# Patient Record
Sex: MALE | Race: WHITE | NOT HISPANIC OR LATINO | Employment: FULL TIME | URBAN - METROPOLITAN AREA
[De-identification: names, ages, dates, MRNs, and addresses within clinical notes are randomized per-mention and may not be internally consistent; named-entity substitution may affect disease eponyms.]

---

## 2022-08-02 ENCOUNTER — OCCUPATIONAL HEALTH (OUTPATIENT)
Dept: URGENT CARE | Facility: CLINIC | Age: 37
End: 2022-08-02

## 2022-08-02 DIAGNOSIS — Z13.9 ENCOUNTER FOR SCREENING: Primary | ICD-10-CM

## 2022-08-02 DIAGNOSIS — Z02.1 PRE-EMPLOYMENT EXAMINATION: Primary | ICD-10-CM

## 2022-08-02 LAB
BREATH ALCOHOL: 0
COLLECTION ONLY: NORMAL
DLCO: NORMAL
FEV1/FVC: NORMAL
FEV1: NORMAL
FVC: NORMAL
TLC: NORMAL

## 2022-08-02 PROCEDURE — 99499 PHYSICAL, BASIC COMPLEXITY: ICD-10-PCS | Mod: S$GLB,,, | Performed by: NURSE PRACTITIONER

## 2022-08-02 PROCEDURE — 92552 PURE TONE AUDIOMETRY AIR: CPT | Mod: S$GLB,,, | Performed by: NURSE PRACTITIONER

## 2022-08-02 PROCEDURE — 97750 PHYSICAL PERFORMANCE TEST: CPT | Mod: S$GLB,,, | Performed by: NURSE PRACTITIONER

## 2022-08-02 PROCEDURE — 80305 PR HAIR COLLECTION ONLY: ICD-10-PCS | Mod: S$GLB,,, | Performed by: EMERGENCY MEDICINE

## 2022-08-02 PROCEDURE — 94010 PULMONARY FUNCTION SCREENING (OCC MED PHYSICALS): ICD-10-PCS | Mod: S$GLB,,, | Performed by: NURSE PRACTITIONER

## 2022-08-02 PROCEDURE — 97750 AGILITY TEST: ICD-10-PCS | Mod: S$GLB,,, | Performed by: NURSE PRACTITIONER

## 2022-08-02 PROCEDURE — 80305 DRUG TEST PRSMV DIR OPT OBS: CPT | Mod: S$GLB,,, | Performed by: EMERGENCY MEDICINE

## 2022-08-02 PROCEDURE — 99080 OSHA QUESTIONNAIRE: ICD-10-PCS | Mod: S$GLB,,, | Performed by: NURSE PRACTITIONER

## 2022-08-02 PROCEDURE — 99172 VISUAL SCREEN, AUTOMATED W/COLOR VISION: ICD-10-PCS | Mod: S$GLB,,, | Performed by: NURSE PRACTITIONER

## 2022-08-02 PROCEDURE — 99172 OCULAR FUNCTION SCREEN: CPT | Mod: S$GLB,,, | Performed by: NURSE PRACTITIONER

## 2022-08-02 PROCEDURE — 99080 SPECIAL REPORTS OR FORMS: CPT | Mod: S$GLB,,, | Performed by: NURSE PRACTITIONER

## 2022-08-02 PROCEDURE — 82075 ASSAY OF BREATH ETHANOL: CPT | Mod: S$GLB,,, | Performed by: EMERGENCY MEDICINE

## 2022-08-02 PROCEDURE — 82075 CHG ASSAY OF BREATH ETHANOL: ICD-10-PCS | Mod: S$GLB,,, | Performed by: EMERGENCY MEDICINE

## 2022-08-02 PROCEDURE — 94010 BREATHING CAPACITY TEST: CPT | Mod: S$GLB,,, | Performed by: NURSE PRACTITIONER

## 2022-08-02 PROCEDURE — 92552 AUDIOGRAM OCC MED: ICD-10-PCS | Mod: S$GLB,,, | Performed by: NURSE PRACTITIONER

## 2022-08-02 PROCEDURE — 99499 UNLISTED E&M SERVICE: CPT | Mod: S$GLB,,, | Performed by: NURSE PRACTITIONER

## 2022-12-30 ENCOUNTER — TELEPHONE (OUTPATIENT)
Dept: TRANSPLANT | Facility: CLINIC | Age: 37
End: 2022-12-30

## 2022-12-30 NOTE — TELEPHONE ENCOUNTER
Contacted patient to review initial intake information. Patient reports the followin. Can you walk up a flight of stairs without getting short of breath or stopping? Yes     2. Can you walk one block without getting short of breath or having to stop? Yes   3. Do you use oxygen? No   4. Do you use a cane, walker, or wheel chair to assist in mobility? No   5. Have you been hospitalized or had recent surgery in the last 6 months? No    A. Stroke   B. Heart surgery or heart catheterization   C. Broken bone  6. Do you have any cuts, open sores (ulcers), or wounds anywhere on your body? No    7. Do you go to dialysis? Yes What days? Daily  8. Preferred appointment day? Anyday  9. Caregiver? Wife (Patricia)    Patient is aware the next steps will include completing records and compliance verification. Patient is aware once provider review and insurance authorization is received we will contact patient to schedule initial visit. Patient is aware that initial visit will begin prior to / at 7 am and will conclude at approximately 3 pm on date of appointment. All questions answered at this time.

## 2023-01-03 ENCOUNTER — TELEPHONE (OUTPATIENT)
Dept: TRANSPLANT | Facility: CLINIC | Age: 38
End: 2023-01-03
Payer: COMMERCIAL

## 2023-01-31 ENCOUNTER — TELEPHONE (OUTPATIENT)
Dept: TRANSPLANT | Facility: CLINIC | Age: 38
End: 2023-01-31
Payer: COMMERCIAL

## 2023-11-16 ENCOUNTER — TELEPHONE (OUTPATIENT)
Dept: TRANSPLANT | Facility: CLINIC | Age: 38
End: 2023-11-16
Payer: MEDICARE

## 2023-11-27 ENCOUNTER — TELEPHONE (OUTPATIENT)
Dept: TRANSPLANT | Facility: CLINIC | Age: 38
End: 2023-11-27
Payer: MEDICARE

## 2023-12-04 ENCOUNTER — TELEPHONE (OUTPATIENT)
Dept: TRANSPLANT | Facility: CLINIC | Age: 38
End: 2023-12-04
Payer: MEDICARE

## 2023-12-06 DIAGNOSIS — Z76.82 ORGAN TRANSPLANT CANDIDATE: Primary | ICD-10-CM

## 2023-12-07 DIAGNOSIS — Z76.82 ORGAN TRANSPLANT CANDIDATE: Primary | ICD-10-CM

## 2024-01-22 ENCOUNTER — TELEPHONE (OUTPATIENT)
Dept: TRANSPLANT | Facility: CLINIC | Age: 39
End: 2024-01-22
Payer: MEDICARE

## 2024-01-22 NOTE — TELEPHONE ENCOUNTER
Returned patient phone call, no answer. Unable to leave voicemail message     ----- Message from Jeanna Aiyana sent at 1/22/2024  3:34 PM CST -----  Regarding: check on labs  Contact: PT  [phone]  The patient called requesting to speak to Nurse regarding labs he had done last Thursday, echo, EKG and also Nuc Stress test at [facility], wants to make sure test are being duplicated. PT states you can contact [facility] and they will send whatever you need. PT is scheduled for a bunch of labs on 1/25 - please verify is duplicates. Please reach out to patient to advise.     No further information provided  [facility] - Austin Martinez   - coordinator      Patient can be contacted @# [phone]

## 2024-01-25 ENCOUNTER — HOSPITAL ENCOUNTER (OUTPATIENT)
Dept: RADIOLOGY | Facility: HOSPITAL | Age: 39
Discharge: HOME | End: 2024-01-25
Payer: MEDICARE

## 2024-01-25 ENCOUNTER — TELEPHONE (OUTPATIENT)
Dept: TRANSPLANT | Facility: CLINIC | Age: 39
End: 2024-01-25
Payer: MEDICARE

## 2024-01-25 ENCOUNTER — OFFICE VISIT (OUTPATIENT)
Dept: TRANSPLANT | Facility: CLINIC | Age: 39
End: 2024-01-25
Payer: MEDICARE

## 2024-01-25 ENCOUNTER — HOSPITAL ENCOUNTER (OUTPATIENT)
Dept: RADIOLOGY | Facility: HOSPITAL | Age: 39
Discharge: HOME | End: 2024-01-25
Attending: NURSE PRACTITIONER
Payer: MEDICARE

## 2024-01-25 VITALS
TEMPERATURE: 97 F | BODY MASS INDEX: 29.59 KG/M2 | DIASTOLIC BLOOD PRESSURE: 74 MMHG | WEIGHT: 199.75 LBS | HEIGHT: 69 IN | OXYGEN SATURATION: 98 % | HEART RATE: 89 BPM | SYSTOLIC BLOOD PRESSURE: 119 MMHG

## 2024-01-25 DIAGNOSIS — Z76.82 ORGAN TRANSPLANT CANDIDATE: ICD-10-CM

## 2024-01-25 DIAGNOSIS — N25.81 SECONDARY HYPERPARATHYROIDISM: Chronic | ICD-10-CM

## 2024-01-25 DIAGNOSIS — Q61.3 POLYCYSTIC KIDNEY: Chronic | ICD-10-CM

## 2024-01-25 DIAGNOSIS — Z01.818 PRE-TRANSPLANT EVALUATION FOR KIDNEY TRANSPLANT: Primary | ICD-10-CM

## 2024-01-25 DIAGNOSIS — N18.6 ESRD ON PERITONEAL DIALYSIS: ICD-10-CM

## 2024-01-25 DIAGNOSIS — I12.9 RENAL HYPERTENSION: ICD-10-CM

## 2024-01-25 DIAGNOSIS — Z76.82 ORGAN TRANSPLANT CANDIDATE: Primary | ICD-10-CM

## 2024-01-25 DIAGNOSIS — Z99.2 ESRD ON PERITONEAL DIALYSIS: ICD-10-CM

## 2024-01-25 PROCEDURE — 76770 US EXAM ABDO BACK WALL COMP: CPT | Mod: TC,TXP

## 2024-01-25 PROCEDURE — 71046 X-RAY EXAM CHEST 2 VIEWS: CPT | Mod: 26,TXP,, | Performed by: STUDENT IN AN ORGANIZED HEALTH CARE EDUCATION/TRAINING PROGRAM

## 2024-01-25 PROCEDURE — 72170 X-RAY EXAM OF PELVIS: CPT | Mod: 26,TXP,, | Performed by: RADIOLOGY

## 2024-01-25 PROCEDURE — 76770 US EXAM ABDO BACK WALL COMP: CPT | Mod: 26,TXP,, | Performed by: RADIOLOGY

## 2024-01-25 PROCEDURE — 72170 X-RAY EXAM OF PELVIS: CPT | Mod: TC,TXP

## 2024-01-25 PROCEDURE — 99204 OFFICE O/P NEW MOD 45 MIN: CPT | Mod: S$PBB,TXP,, | Performed by: PHYSICIAN ASSISTANT

## 2024-01-25 PROCEDURE — 71046 X-RAY EXAM CHEST 2 VIEWS: CPT | Mod: TC,TXP

## 2024-01-25 PROCEDURE — 99205 OFFICE O/P NEW HI 60 MIN: CPT | Mod: S$PBB,TXP,, | Performed by: NURSE PRACTITIONER

## 2024-01-25 PROCEDURE — 99999 PR PBB SHADOW E&M-EST. PATIENT-LVL IV: CPT | Mod: PBBFAC,TXP,, | Performed by: NURSE PRACTITIONER

## 2024-01-25 PROCEDURE — 99214 OFFICE O/P EST MOD 30 MIN: CPT | Mod: PBBFAC,25,TXP | Performed by: NURSE PRACTITIONER

## 2024-01-25 RX ORDER — ALLOPURINOL 100 MG/1
100 TABLET ORAL DAILY
COMMUNITY

## 2024-01-25 RX ORDER — CALCIUM CARBONATE 200(500)MG
2 TABLET,CHEWABLE ORAL
COMMUNITY

## 2024-01-25 RX ORDER — CALCITRIOL 0.5 UG/1
0.5 CAPSULE ORAL
COMMUNITY

## 2024-01-25 RX ORDER — CARVEDILOL 12.5 MG/1
12.5 TABLET ORAL DAILY
COMMUNITY

## 2024-01-25 RX ORDER — POTASSIUM CHLORIDE 750 MG/1
10 TABLET, EXTENDED RELEASE ORAL ONCE
COMMUNITY

## 2024-01-25 NOTE — PROGRESS NOTES
Transplant Surgery  Kidney Transplant Recipient Evaluation    Referring Physician: Waldemar Landry  Current Nephrologist: Waldemar Landry    Subjective:     Reason for Visit: evaluate transplant candidacy    History of Present Illness: Chava Lucia is a 38 y.o. year old male undergoing transplant evaluation.    Dialysis History: Chava is on peritoneal dialysis.      Transplant History: N/A    Etiology of Renal Disease: Polycystic Kidneys (based on medical records from referral).    External provider notes reviewed: Yes    Review of Systems  Objective:     Physical Exam:  Constitutional:   Vitals reviewed: yes   Well-nourished and well-groomed: yes  Eyes:   Sclerae icteric: no   Extraocular movements intact: yes  GI:    Bowel sounds normal: yes   Tenderness: no    If yes, quadrant/location: not applicable   Palpable masses: yes    If yes, quadrant/location:  bilateral PCK are palpable.  There is enough room to place a kidney bilaterally   Hepatosplenomegaly: no   Ascites: no   Hernia: no    If yes, type/location: not applicable   Surgical scars: yes    If yes, type/location: laparoscopic port sites, umbo hernia, PD cath in place  Resp:   Effort normal: yes   Breath sounds normal: yes    CV:   Regular rate and rhythm: yes   Heart sounds normal: yes   Femoral pulses normal: yes   Extremities edematous: no  Skin:   Rashes or lesions present: no    If yes, describe:not applicable   Jaundice:: no    Musculoskeletal:   Gait normal: yes   Strength normal: yes  Psych:   Oriented to person, place, and time: yes   Affect and mood normal: yes    Additional comments: not applicable    Diagnostics:  The following labs have been reviewed: CBC  CMP  INR  The following radiology images have been independently reviewed and interpreted: Pelvic Xray    Counseling: We provided Chava Lucia with a group education session today.  We discussed kidney transplantation at length with him, including risks, potential complications, and  alternatives in the management of his renal failure.  The discussion included complications related to anesthesia, bleeding, infection, primary nonfunction, and ATN.  I discussed the typical postoperative course, length of hospitalization, the need for long-term immunosuppression, and the need for long-term routine follow-up.  I discussed living-donor and -donor transplantation and the relative advantages and disadvantages of each.  I also discussed average waiting times for both living donation and  donation.  I discussed national and center-specific survival rates.  I also mentioned the potential benefit of multicenter listing to candidates listed with centers within more than one organ procurement organization.  All questions were answered.    Patient advised that it is recommended that all transplant candidates, and their close contacts and household members receive Covid vaccination.    Final determination of transplant candidacy will be made once evaluation is complete and reviewed by the Kidney & Kidney/Pancreas Selection Committee.    Coronavirus disease (COVID-19) caused by severe acute respiratory virus coronavirus 2 (SARS-C0V 2) is associated with increased mortality in solid organ transplant recipients (SOT) compared to non-transplant patients. Vaccine responses to vaccination are depressed against SARS-CoV2 compared to normal individuals but improve with third vaccination doses. Vaccination prior to SOT provides both the best opportunity for transplant candidates to develop protective immunity and to reduce the risk of serious COVID19 infections post transplantation. Organ transplant candidates at [facility] will be required to receive SARS-CoV-2 vaccination prior to being listed with a an active status, whenever possible. Exceptions will be made for disability related reasons or for sincerely held [employer] beliefs.          Transplant Surgery -  Candidacy   Assessment/Plan:   Chava Lucia has end stage renal disease (ESRD) on dialysis. I see no surgical contraindication to placing a kidney transplant. Based on available information, Chava Lucia is a suitable kidney transplant candidate.     There is enough room to place a kidney bilaterally    Additional testing to be completed according to the Written Order Guidelines for Adult Pre-kidney and Pancreas Transplant Evaluation (KI-02).  Interpretation of tests and discussion of patient management involves all members of the multidisciplinary transplant team.    Kimani Whalen Jr, MD

## 2024-01-25 NOTE — PROGRESS NOTES
Transplant Nephrology  Kidney Transplant Recipient Evaluation    Referring Physician: Waldemar Landry  Current Nephrologist: Waldemar Landry    Subjective:   CC:  Initial evaluation of kidney transplant candidacy.    HPI:  Mr. Lucia is a 38 y.o. year old White male who has presented to be evaluated as a potential kidney transplant recipient.  He has ESRD secondary to polycystic kidney disease.  Patient is currently on peritoneal dialysis started on 11/23/2022. Patient is dialyzing on cyclic peritoneal dialysis.  Patient reports that he is tolerating dialysis well. . He has a PD catheter for dialysis access.     Going through evaluation at [facility] and [facility] as well.    Known PKD since he was a teenager. Mother and sister also with PKD. He reports negative MRA 10+ years ago, scheduled to repeat with [facility]. Mom with history of aneurysm. He reports no major issues with kidneys, has had cyst rupture and kidney stones in the past. Completed echo and stress last week at [facility].     Previously working as a . Very active, no functional deficits. Looks great, not frail.    Denies MI, CVA, DVT/PE, or malignancy history.      Current Outpatient Medications   Medication Sig Dispense Refill    allopurinoL (ZYLOPRIM) 100 MG tablet Take 100 mg by mouth once daily.      calcitRIOL (ROCALTROL) 0.5 MCG Cap Take 0.5 mcg by mouth every Mon, Wed, Fri.      calcium carbonate (TUMS) 200 mg calcium (500 mg) chewable tablet Take 2 tablets by mouth 3 (three) times daily with meals.      carvediloL (COREG) 12.5 MG tablet Take 12.5 mg by mouth once daily.      folic acid/vit B complex and C (RENAVIT MULTIVITAMIN ORAL) Take 1 tablet by mouth once daily.      potassium chloride (KLOR-CON) 10 MEQ TbSR Take 10 mEq by mouth once.       No current facility-administered medications for this visit.       Past Medical History:   Diagnosis Date    Disorder of kidney and ureter     Hypertension     Polycystic kidney disease        Past Medical and  "Surgical History: Mr. Lucia  has a past medical history of Disorder of kidney and ureter, Hypertension, and Polycystic kidney disease.  He has a past surgical history that includes Knee surgery; Lithotripsy; Hernia repair; and Peritoneal catheter insertion.    Past Social and Family History: Mr. Lucia reports that he has never smoked. He has never used smokeless tobacco. He reports current alcohol use of about 2.0 standard drinks of alcohol per week. He reports that he does not use drugs. His family history includes Polycystic kidney disease in his mother and sister.    Review of Systems   Constitutional:  Positive for fatigue. Negative for activity change, appetite change and fever.   HENT:  Negative for congestion, mouth sores and sore throat.    Eyes:  Negative for visual disturbance.   Respiratory:  Negative for cough, chest tightness and shortness of breath.    Cardiovascular:  Negative for chest pain, palpitations and leg swelling.   Gastrointestinal:  Negative for abdominal distention, abdominal pain, constipation, diarrhea and nausea.   Genitourinary:  Positive for decreased urine volume. Negative for difficulty urinating, frequency and hematuria.        Still making good amount of urine   Musculoskeletal:  Negative for arthralgias and gait problem.   Skin:  Negative for wound.   Allergic/Immunologic: Negative for environmental allergies, food allergies and immunocompromised state.   Neurological:  Negative for dizziness, weakness and numbness.   Psychiatric/Behavioral:  Negative for sleep disturbance. The patient is not nervous/anxious.        Objective:   Blood pressure 119/74, pulse 89, temperature 97.2 °F (36.2 °C), temperature source Temporal, height 5' 8.98" (1.752 m), weight 90.6 kg (199 lb 11.8 oz), SpO2 98 %.body mass index is 29.52 kg/m².    Physical Exam  Vitals and nursing note reviewed.   Constitutional:       Appearance: Normal appearance.   HENT:      Head: Normocephalic.   Cardiovascular: "      Rate and Rhythm: Normal rate and regular rhythm.      Heart sounds: Normal heart sounds.   Pulmonary:      Effort: Pulmonary effort is normal.      Breath sounds: Normal breath sounds.   Abdominal:      General: Bowel sounds are normal. There is no distension.      Palpations: Abdomen is soft.      Tenderness: There is no abdominal tenderness.      Comments: PD catheter , no erythema, edema, tenderness or drainage.    Musculoskeletal:         General: Normal range of motion.   Skin:     General: Skin is warm and dry.   Neurological:      General: No focal deficit present.      Mental Status: He is alert.   Psychiatric:         Behavior: Behavior normal.         Labs:  Lab Results   Component Value Date    WBC 11.55 01/25/2024    HGB 12.8 (L) 01/25/2024    HCT 38.1 (L) 01/25/2024     01/25/2024    K 3.6 01/25/2024     01/25/2024    CO2 22 (L) 01/25/2024    BUN 45 (H) 01/25/2024    CREATININE 11.1 (H) 01/25/2024    EGFRNORACEVR 5.5 (A) 01/25/2024    CALCIUM 9.9 01/25/2024    PHOS 4.6 (H) 01/25/2024    ALBUMIN 3.8 01/25/2024    AST 9 (L) 01/25/2024    ALT 13 01/25/2024    .1 (H) 01/25/2024       Labs were reviewed with the patient.    Assessment:     1. Pre-transplant evaluation for kidney transplant    2. ESRD on peritoneal dialysis    3. Polycystic kidney    4. Renal hypertension    5. Secondary hyperparathyroidism    6. BMI 29.0-29.9,adult      Plan:   38 y.o. year old White male who has presented to be evaluated as a potential kidney transplant recipient.  He has ESRD secondary to polycystic kidney disease.  Patient is currently on peritoneal dialysis started on 11/23/2022. Will get recent testing from [facility] (MRA, echo, stress). Will be ready for selection afternoon afternoon testing.     Transplant Candidacy:   Based on available information, Mr. Lucia is an excellent kidney transplant candidate.   Meets center eligibility for accepting HCV+ donor offer - Yes.  Patient educated on HCV+  donors. Chava is willing to accept HCV+ donor offer - Yes   Patient is a candidate for KDPI > 85 kidney donor offer - No (age).  Final determination of transplant candidacy will be made once workup is complete and reviewed by the selection committee.    Patient advised that it is recommended that all transplant candidates, and their close contacts and household members receive Covid vaccination.    UNOS Patient Status  Functional Status: 90% - Able to carry on normal activity: minor symptoms of disease    Salome Malhotra, NP

## 2024-01-25 NOTE — LETTER
January 26, 2024        Waldemar Landry  [address]  [facility] [place] [address]  Phone: [phone]  Fax: [phone]             [facility]- Transplant 1st Fl  [address]  [facility] [address]  Phone: [phone]   Patient: Chava Lucia   MR Number: [id number]   YOB: 1985   Date of Visit: 1/25/2024       Dear Dr. Waldemar Landry    Thank you for referring Chava Lucia to me for evaluation. Attached you will find relevant portions of my assessment and plan of care.    If you have questions, please do not hesitate to call me. I look forward to following Chava Lucia along with you.    Sincerely,    Salome Malhotra NP    Enclosure    If you would like to receive this communication electronically, please contact [email or web address] or [phone] to request [employer] Link access.    [employer] is a tool which provides read-only access to select patient information with whom you have a relationship. Its easy to use and provides real time access to review your patients record including encounter summaries, notes, results, and demographic information.    If you feel you have received this communication in error or would no longer like to receive these types of communications, please e-mail [email or web address]

## 2024-01-25 NOTE — TELEPHONE ENCOUNTER
Reviewed pt transplant labs.  Notified dialysis unit dietitian of the following abnormal labs via fax and requested their most recent nutrition note on this pt.  Once this note is received it will be scanned into pt's chart.     Glucose 123  Phos 4.6

## 2024-01-25 NOTE — PROGRESS NOTES
Transplant Recipient Adult Psychosocial Assessment    SW completed assessment with patient and pt's wife Patricia Lucia in clinic.    Pt reports having started kidney transplant evaluation at [facility] as well.    Chava Lucia  [address] [place] [address]  Telephone Information:   Mobile [phone]   Home  [phone] (home)  Work  There is no work phone number on file.  E-mail  [email or web address]    Sex: male  YOB: 1985  Age: 38 y.o.    Encounter Date: 2024  [place] Citizen: yes  Primary Language: English   Needed: no    Emergency Contact:  Name: Patricia Lucia  Relationship: wife  Address: same as patient  Phone Numbers: [phone] (mobile)    Family/Social Support:   Number of dependents/: Patient has 3 minor children (10 yo son Krystal, 11 yo daughter Zac, 15 yo daughter Deborah) living in the home.  Pt reports mother-in-law / wife's family members will assist with  at time of pt's transplant.  Marital history: Patient reports being  to wife Patricia Lucia of 11 years and has been in committed relationship with Patricia for the past 19 years.  Other family dynamics: Patient's parents and only sibling (sister) is .  Patient reports mother and sister both  with PKD.  Pt reports having 1 friend living in [place] and 1 friend living near pt; however, pt reports social support consists primarily of wife's family.    Household Composition:  Name: Chava & Patricia Lucia  Age: 38 & 34 (respectively)  Relationship: patient and wife  Does person drive? yes (both drive)    Name: Deborah ([phone]), Zac ([phone]), & Krystal ([phone]) [place]  Age: 16, 12, & 11 (respectively)  Relationship:  children  Does person drive? yes (Deborah drives.)    Do you and your caregivers have access to reliable transportation? yes  PRIMARY CAREGIVER: Patricia Lucia, pt's wife, will be primary caregiver, phone number [phone].     Social  worker provided in-depth information to patient and caregiver regarding pre- and post-transplant caregiver role.   strongly encourages patient and caregiver to have concrete plan regarding post-transplant care giving, including back-up caregiver(s) to ensure care giving needs are met as needed.    Patient and Caregiver states understanding all aspects of caregiver role/commitment and is able/willing/committed to being caregiver to the fullest extent necessary.    Patient and Caregiver verbalizes understanding of the education provided today and caregiver responsibilities.         remains available. Patient and Caregiver agree to contact  in a timely manner if concerns arise.      Able to take time off work without financial concerns: yes.     Additional Significant Others who will Assist with Transplant:  Name: Jasmyne Pierce ([phone])  Age: 51  City: [place] State: [place]  Relationship:  mother-in-law  Does person drive? yes    Living Will: no  Healthcare Power of : no  Advance Directives on file: <<no information> per medical record.  Verbally reviewed LW/HCPA information.   provided patient with copy of LW/HCPA documents and provided education on completion of forms.    Living Donors: No. Education and resource information given to patient.    Highest Education Level: High School (9-12) or GED  Reading Ability: 12th grade  Reports difficulty with: N/A  Learns Best By:  hands-on learning     Status: no  VA Benefits: no     Working for Income: No  If no, reason not working: Disability  Patient is disabled.  Prior to disability, patient  was employed as an offshore .  Patient reports last working in November 2022.    Spouse/Significant Other Employment: Patient's wife / primary caregiver Patricia Lucia works part-time contract worker for [employer].    Disabled: yes: date disability began: November 2022,  due to: ESRD.    Monthly Income:  Other: $4,000 (combined income from pt's SSDI & wife's salary/wages)  Able to afford all costs now and if transplanted, including medications: yes  Patient and Caregiver verbalizes understanding of personal responsibilities related to transplant costs and the importance of having a financial plan to ensure that patients transplant costs are fully covered.      provided fundraising information/education.  Patient and Caregiver verbalizes understanding.   remains available.    Insurance:   Payer/Plan Subscr  Sex Relation Sub. Ins. ID Effective Group Num   1. MEDICARE - [place]* GER[place]IA MUJICA 1985 Male Self [id number]                                    [address]   2. [employer] [place]* GER[place]IA MUJICA  1985 Male Self [id number]                                    [address]     Primary Insurance (for UNOS reporting): Public Insurance - Medicare FFS (Fee For Service)  Secondary Insurance (for UNOS reporting): Private Insurance - [place] Medicare Supplement  Patient and Caregiver verbalizes clear understanding that patient may experience difficulty obtaining and/or be denied insurance coverage post-surgery. This includes and is not limited to disability insurance, life insurance, health insurance, burial insurance, long term care insurance, and other insurances.    Patient and Caregiver also reports understanding that future health concerns related to or unrelated to transplantation may not be covered by patient's insurance.  Resources and information provided and reviewed.      Patient and Caregiver provides verbal permission to release any necessary information to outside resources for patient care and discharge planning.  Resources and information provided are reviewed.      Dialysis Adherence:  Patient reports starting dialysis in 2022, current receiving peritoneal dialysis, and maintaining full  adherence to PD treatment regimen.  Dialysis adherence form completed and returned by patient's dialysis unit can be found under 'Media' section of EMR.  Compliance reported as satisfactory.    Infusion Service: patient utilizing? no  Home Health: patient utilizing? no  DME: yes - PD equipment  Pulmonary/Cardiac Rehab: no   ADLS:  Pt reports being independent with all ADLs and mobility and driving himself.    Adherence:   Pt/caregiver reports pt has exhibited good adherence to medication regimen, appointment schedule, and medical recommendations in the past.  Adherence education and counseling provided.     Per History Section:  Past Medical History:   Diagnosis Date    Disorder of kidney and ureter     Hypertension     Polycystic kidney disease      Social History     Tobacco Use    Smoking status: Never    Smokeless tobacco: Never   Substance Use Topics    Alcohol use: Yes     Alcohol/week: 2.0 standard drinks of alcohol     Types: 2 Shots of liquor per week     Comment: Sometimes     Social History     Substance and Sexual Activity   Drug Use Never     Social History     Substance and Sexual Activity   Sexual Activity Yes    Partners: Female       Per Today's Psychosocial:  Tobacco: none, patient denies any use.  Alcohol: none, patient denies any use.  Illicit Drugs/Non-prescribed Medications: none, patient denies any use.    Patient and Caregiver states clear understanding of the potential impact of substance use as it relates to transplant candidacy and is aware of possible random substance screening.  Substance abstinence/cessation counseling, education and resources provided and reviewed.     Arrests/DWI/Treatment/Rehab: patient denies    Psychiatric History:    Mental Health: Pt denies any past and/or present mental health symptoms and/or diagnoses.  Psychiatrist/Counselor: Pt denies ever receiving care from psychiatrist/counselor.  Medications:  Pt denies ever being prescribed medications for mental health  care.  Suicide/Homicide Issues: Pt denies any past and/or present SI/HI.   Safety at home: Pt denies having any past or present concerns regarding safety at home including but not limited to physical/emotional/sexual abuse, neglect, or exploitation.    Knowledge: Patient and Caregiver states having clear understanding and realistic expectations regarding the potential risks and potential benefits of organ transplantation and organ donation, agrees to discuss with health care team members and support system members, and to utilize available resources and express questions and/or concerns in order to further facilitate the pt informed decision-making.  Resources and information provided and reviewed.     Patient and Caregiver is aware of [facility]'s affiliation and/or partnership with agencies in home health care, LTAC, SNF, [facility], and other hospitals and clinics.    Understanding: Patient and Caregiver reports having a clear understanding of the many lifetime commitments involved with being a transplant recipient, including costs, compliance, medications, lab work, procedures, appointments, concrete and financial planning, preparedness, timely and appropriate communication of concerns, abstinence (ETOH, tobacco, illicit non-prescribed drugs), adherence to all health care team recommendations, support system and caregiver involvement, appropriate and timely resource utilization and follow-through, mental health counseling as needed/recommended, and patient and caregiver responsibilities.  Social Service Handbook, resources and detailed educational information provided and reviewed.  Educational information provided.    Patient and Caregiver also reports current and expected compliance with health care regime and states having a clear understanding of the importance of compliance.      Patient and Caregiver reports a clear understanding that risks and benefits may be involved with organ transplantation and with organ  donation.      Patient and Caregiver also reports clear understanding that psychosocial risk factors may affect patient, and include but are not limited to feelings of depression, generalized anxiety, anxiety regarding dependence on others, post traumatic stress disorder, feelings of guilt and other emotional and/or mental concerns, and/or exacerbation of existing mental health concerns.  Detailed resources provided and discussed.     Patient and Caregiver agrees to access appropriate resources in a timely manner as needed and/or as recommended, and to communicate concerns appropriately.  Patient and Caregiver also reports a clear understanding of treatment options available.      reviewed education, provided additional information, and answered questions.    Feelings or Concerns: Pt expresses motivation to continue pursuing transplant and denies any transplant related concerns at this time.    Coping: Identify Patient & Caregiver Strategies to [place]:   1. In the past - go kart racing with son; staying busy with household tasks   2. Currently & Pre-transplant - same as above   3. At the time of surgery - family support   4. During post-Transplant & Recovery Period - all of the above    Goals: Pt reports post-transplant goals include discontinuing dialysis and returning to work.  Patient referred to Vocational Rehabilitation.    Interview Behavior: Patient and Caregiver presents as alert and oriented x 4, pleasant, good eye contact, well groomed, recall good, concentration/judgement good, average intelligence, calm, communicative, cooperative, and asking and answering questions appropriately.  Patient accompanied to clinic by wife Patricia Lucia who presents as highly attentive, highly supportive, and actively engaged in pt's day-to-day healthcare maintenance.         Transplant Social Work - Candidacy  Assessment/Plan:     Psychosocial Suitability: Based on psychosocial risk factors, patient presents as   a low risk  candidate for kidney transplant at this time due to established caregiver plan, supportive family system, no reported history of substance abuse and/or mental health concerns, adequate insurance coverage and personal finances to cover transplant cost, and realistic beliefs and expectations regarding risks and benefits of transplant.    Final determination of transplant candidacy to be made by Transplant Selection Committee.    Recommendations/Additional Comments: SW recommends pt establish local lodging plan for immediate post-transplant recovery period.  SW recommends that pt conduct fundraising to assist pt with pay for cost of medications, food, gas, and other transplant related needs.  SW recommends that pt remain aware of potential mental health concerns and contact the team if any concerns arise.  SW recommends that pt remain abstinent from tobacco, ETOH, and drug use.  SW supports pt's continued adherence. SW remains available to answer any questions or concerns that arise as the pt moves through the transplant process.     Alan Millan

## 2024-01-25 NOTE — PROGRESS NOTES
PHARM.D. PRE-TRANSPLANT NOTE:    This patient's medication therapy was evaluated as part of his pre-transplant evaluation.      The following general pharmacologic concerns were noted: none    The following concerns for post-operative pain management were noted: none    The following pharmacologic concerns related to HCV therapy were noted: none      This patient's medication profile was reviewed for considerations for DAA Hepatitis C therapy:    [x]  No current inducers of CYP 3A4 or PGP  [x]  No amiodarone on this patient's EMR profile in the last 24 months  [x]  No past or current atrial fibrillation on this patient's EMR profile       Current Outpatient Medications   Medication Sig Dispense Refill    allopurinoL (ZYLOPRIM) 100 MG tablet Take 100 mg by mouth once daily.      calcitRIOL (ROCALTROL) 0.5 MCG Cap Take 0.5 mcg by mouth every Mon, Wed, Fri.      calcium carbonate (TUMS) 200 mg calcium (500 mg) chewable tablet Take 2 tablets by mouth 3 (three) times daily with meals.      carvediloL (COREG) 12.5 MG tablet Take 12.5 mg by mouth once daily.      folic acid/vit B complex and C (RENAVIT MULTIVITAMIN ORAL) Take 1 tablet by mouth once daily.      potassium chloride (KLOR-CON) 10 MEQ TbSR Take 10 mEq by mouth once.       No current facility-administered medications for this visit.           I am available for consultation and can be contacted, as needed by the other members of the Transplant team.

## 2024-01-25 NOTE — PROGRESS NOTES
PRE-TRANSPLANT INFECTIOUS DISEASE CONSULT    Reason for Visit:  Pre-transplant evaluation  Referring Provider: Dr. Waldemar Landry     History of Present Illness:    38 y.o. male with a history of polycystic kidney disease presents for pre-kidney transplant evaluation. Patient is currently on PD. Denies hx of peritonitis or catheter related infections.    Infectious History:  Recent hospital admissions: No  Recent infections: No  Recent or current antibiotic use: No  History of recurrent infections: No  History of diabetic foot wound or bone/joint infection: No  Recent dental infections, issues or procedures: Yes - broken teeth  History of chicken pox: Yes  History of shingles: No  History of STI: No  History of COVID infection: No    History of Immunosuppression:  Prior chemotherapy / immunosuppression: No  Prior transplant: No  History of splenectomy: No    Tuberculosis:  Prior screening for latent TB: Yes  Prior diagnosis of latent TB: No  Risk factors for TB *known exposure, incarceration, homelessness*: No    Geographical exposures:  Currently lives in [place] with wife and three kids   Lived in the following states: [place]  Lived or travelled to the [facility] [place]: No  International travel: No  Travel-associated illness: No    Social/Environmental:  Occupation:  Previously worked in construction and offshore  Pets: Yes - flying squirrels, hedgehogs, ferret, rabbit, two dogs  Livestock: No  Fishing / hunting: Yes   Hobbies: fishing and hunting  Water: [place]  Consumption of raw/undercooked meat or seafood?  No  Tobacco: No  Alcohol: No  Recreational drug use:  No  Sexual partners: wife      Past Histories:   Past Medical History:   Diagnosis Date    Disorder of kidney and ureter     Hypertension     Polycystic kidney disease      Past Surgical History:   Procedure Laterality Date    HERNIA REPAIR      KNEE SURGERY      LITHOTRIPSY      PERITONEAL CATHETER INSERTION       Family History   Problem Relation Age of Onset     Polycystic kidney disease Mother     Polycystic kidney disease Sister      Social History     Tobacco Use    Smoking status: Never    Smokeless tobacco: Never   Substance Use Topics    Alcohol use: Yes     Alcohol/week: 2.0 standard drinks of alcohol     Types: 2 Shots of liquor per week     Comment: Sometimes    Drug use: Never     Review of patient's allergies indicates:   Allergen Reactions    Penicillin      Childhood allergy       Current antibiotics:  Antibiotics (From admission, onward)      None              Review of Systems  Review of Systems   Constitutional: Negative for chills, fever, malaise/fatigue and night sweats.   HENT:  Negative for congestion and sore throat.    Eyes:  Negative for blurred vision and visual disturbance.   Cardiovascular:  Negative for chest pain and leg swelling.   Respiratory:  Negative for cough, shortness of breath and sputum production.    Skin:  Negative for color change, dry skin and itching.   Musculoskeletal:  Negative for back pain, joint pain and joint swelling.   Gastrointestinal:  Negative for abdominal pain, diarrhea, heartburn, nausea and vomiting.   Genitourinary:  Negative for dysuria, flank pain and hematuria.   Neurological:  Negative for dizziness, numbness and weakness.   Psychiatric/Behavioral:  Negative for altered mental status and depression. The patient is not nervous/anxious.           Objective  Physical Exam  Constitutional:       General: He is not in acute distress.     Appearance: Normal appearance. He is well-developed. He is not ill-appearing or diaphoretic.   HENT:      Head: Normocephalic and atraumatic.      Right Ear: External ear normal.      Left Ear: External ear normal.      Nose: Nose normal.   Eyes:      General: No scleral icterus.        Right eye: No discharge.         Left eye: No discharge.      Extraocular Movements: Extraocular movements intact.      Conjunctiva/sclera: Conjunctivae normal.   Pulmonary:      Effort: Pulmonary  "effort is normal. No respiratory distress.      Breath sounds: No stridor.   Skin:     General: Skin is dry.      Coloration: Skin is not jaundiced or pale.      Findings: No erythema.   Neurological:      General: No focal deficit present.      Mental Status: He is alert and oriented to person, place, and time. Mental status is at baseline.   Psychiatric:         Mood and Affect: Mood normal.         Behavior: Behavior normal.         Thought Content: Thought content normal.         Judgment: Judgment normal.           Labs:    CBC:   Lab Results   Component Value Date    WBC 11.55 01/25/2024    HGB 12.8 (L) 01/25/2024    HCT 38.1 (L) 01/25/2024    MCV 96 01/25/2024     01/25/2024    GRAN 6.3 01/25/2024    GRAN 54.6 01/25/2024    LYMPH 3.6 01/25/2024    LYMPH 31.5 01/25/2024    MONO 1.2 (H) 01/25/2024    MONO 10.1 01/25/2024    EOSINOPHIL 2.8 01/25/2024       Syphilis screening: No results found for: "RPR", "PRPQ", "FTAABS"     TB screening: No results found for: "TBGOLDPLUS", "TSPOTSCREN"    HIV screening:   Lab Results   Component Value Date    RTH96LHUE Non-reactive 01/25/2024       Strongyloides IgG: No results found for: "STRONGANTIGG"    Hepatitis Serologies:   Lab Results   Component Value Date    HEPAIGG Non-reactive 01/25/2024    HEPBCAB Non-reactive 01/25/2024    HEPBSAB <3.00 01/25/2024    HEPBSAB Non-reactive 01/25/2024    HEPCAB Non-reactive 01/25/2024        Varicella IgG: No results found for: "VARICELLAINT"      Immunization History   Administered Date(s) Administered    COVID-19 Vaccine 07/01/2022, 07/29/2022    COVID-19, MRNA, LN-S, PF (MODERNA FULL 0.5 ML DOSE) 07/01/2022, 07/29/2022    COVID-19, mRNA, LNP-S, bivalent booster, PF (PFIZER OMICRON) 01/11/2023    DTP 1985, 02/14/1986, 02/10/1987, 11/03/1987, 04/26/1991    Hepatitis B, Adult 02/16/2004    Hepatitis B, Dialysis, 3 Dose 11/30/2022, 12/29/2022, 02/14/2023, 10/10/2023    Hepatitis B, Pediatric/Adolescent 01/15/2004    " Hib-HbOC 11/03/1987    MMR 02/10/1987, 04/26/1991    OPV 1985, 02/14/1986, 02/10/1987, 11/03/1987, 04/26/1991    PPD Test 11/30/2022, 12/20/2022    Td (ADULT) 10/04/1999    Td - PF (ADULT) 10/04/1999   Hep A/B - non immune  Pneumococcal vaccine: never   Influenza - never  Shingrix - never    Immunization History:  Received all childhood vaccines: Yes  All household members receive annual flu vaccine: No  All household members are up to date on COVID vaccine: No     Assessment and Plan    1. Risks of Infection: Available serologies were reviewed. No unusual risks of infection or significant barriers to transplantation were identified from the infectious disease standpoint given the information available at this time.    - Acute infectious issues: None. Encouraged patient to see a dentist as planned.   - Pending serologies: Quantiferon gold / T-spot, RPR, Strongyloides IgG, and VZV IgG   - Please call if any pending serologic testing is positive.    2. Immunizations:  Based on the patient's immunization history and serologies, the following immunizations are recommended:  - Hepatitis A    Patient does not have immunity to hepatitis A    Vaccination ordered today: Yes   - Hepatitis B    Patient does not have immunity to hepatitis B    Vaccination ordered today: Yes   - COVID    Current CDC vaccination recommendations were discussed with the patient   - Annual high dose influenza     Vaccination ordered today: No. Reason for not ordering: patient declined   - Prevnar 20    Vaccination ordered today: Yes   - Tdap    Vaccination ordered today: Yes   - Shingrix    Vaccination ordered today: No. Reason for not ordering: patient declined    Recommended Pre-Transplant Immunization Schedule   Vaccine  0m 1m 2m 6m   Pneumococcal conjugate vaccine (Prevnar 20) X      Tetanus-diphtheria-pertussis (Tdap)* X      Hepatitis A Vaccine (Havrix)** X   X   Hepatitis B Vaccine (Heplisav)** X X     Influenza (annual) X      Zoster  Recombinant Vaccine (Shingrix) X  X           *Administer booster every 10 years.       **Administer if no immunity demonstrated on serologies               Patient will receive vaccines at local pharmacy. A written prescription was provided for all vaccine doses.     3. Counseling:   I discussed with the patient the risk for increased susceptibility to infections following transplantation including increased risk for infection right after transplant and if rejection should occur.  The patient has been counseled on the importance of vaccinations to decrease risk of infection and severe illness. Specific guidance has been provided to the patient regarding the patient's occupation, hobbies and activities to avoid future infectious complications.     4. Transplant Candidacy: Based on available information, there are no identified significant barriers to transplantation from an infectious disease standpoint.  Final determination of transplant candidacy will be made once evaluation is complete and reviewed by the Selection Committee.      Follow up with infectious disease as needed.       The total time for evaluation and management services performed on 01/25/2024 was greater than 45 minutes.

## 2024-01-26 NOTE — PROGRESS NOTES
INITIAL PATIENT EDUCATION NOTE     Mr. Chava Lucia was seen in pre-kidney transplant clinic for evaluation for kidney, kidney/pancreas or pancreas only transplant.  The patient attended an individual video education session that discussed/reviewed the following aspects of transplantation: evaluation including diagnostic and laboratory testing,(Chemistries, Hematology, Serologies including HIV and Hepatitis and HLA) required for transplantation and selection committee process, UNOS waitlist management/multiple listings, types of organs offered (KDPI < 85%, KDPI > 85%, PHS risk, DCD, HCV+, HIV+ for HIV+ recipients and enbloc/dual), financial aspects, surgical procedures, dietary instruction pre- and post-transplant, health maintenance pre- and post-transplant, post-transplant hospitalization and outpatient follow-up, potential to participate in a research protocol, and medication management and side effects.  A question and answer session was provided after the presentation.    The patient was seen by all members of the multi-disciplinary team to include: Nephrologist/ANGEL, Surgeon, , Transplant Coordinator, , Pharmacist and Dietician (if applicable).    The patient reviewed and signed all consents for evaluation which were witnessed and sent to scanning into the [facility] chart.    The patient was given an education book and plan for further evaluation based on his individual assessment.      Reviewed program requirement for complete COVID vaccination with documentation prior to listing.  COVID education information reviewed with patient. Patient encouraged to be up to date on all vaccinations.     The patient was informed that the transplant team would manage immediate post op pain. If the patient requires long term pain management, they will need to have that pain management addressed by their PCP or previous provider who wrote for long term pain medicines.    The patient was  encouraged to call with any questions or concerns.

## 2024-01-31 ENCOUNTER — DOCUMENTATION ONLY (OUTPATIENT)
Dept: TRANSPLANT | Facility: CLINIC | Age: 39
End: 2024-01-31
Payer: MEDICARE

## 2024-02-08 ENCOUNTER — TELEPHONE (OUTPATIENT)
Dept: TRANSPLANT | Facility: CLINIC | Age: 39
End: 2024-02-08
Payer: MEDICARE

## 2024-02-08 NOTE — TELEPHONE ENCOUNTER
----- Message from Kimani Whalen Jr., MD sent at 2/8/2024  2:50 PM CST -----  Looks acceptable    ----- Message -----  From: Abadie, Michelle, RN  Sent: 1/31/2024   5:00 PM CST  To: Kimani Whalen Jr., MD    Hi Dr Whalen,    This report was not routed to you. Please review.  Thank you,  Loren       ----- Message from Lala Perez sent at 12/4/2019  2:40 PM CST -----  Contact: Abbie Canas [facility]  Ms. Canas is calling to speak with Staff regarding a new prescription; Oxycodone, 20 mg tablets.  Pt received other pain meds recently, a lot in less than a month.    She can be reached at [phone].    Thank you.

## 2024-02-08 NOTE — TELEPHONE ENCOUNTER
----- Message from Kimani Whalen Jr., MD sent at 2/8/2024  2:50 PM CST -----  Looks ok  ----- Message -----  From: Abadie, Michelle, RN  Sent: 1/31/2024   5:00 PM CST  To: Kimani Whalen Jr., MD    Hi Dr Whalen,    This report was not routed to you. Please review.  Thank you,  Loren

## 2024-02-14 ENCOUNTER — TELEPHONE (OUTPATIENT)
Dept: TRANSPLANT | Facility: CLINIC | Age: 39
End: 2024-02-14
Payer: MEDICARE

## 2024-02-14 NOTE — TELEPHONE ENCOUNTER
----- Message from Feng Pittman MA sent at 2/14/2024  2:07 PM CST -----  Regarding: FW: Consult/Advisory  Contact: Nurse Michelle @ [facility]    ----- Message -----  From: Silvia Winchester  Sent: 2/14/2024   2:00 PM CST  To: [facility] Pre-Kidney Transplant Non-Clinical  Subject: Consult/Advisory                                 Consult/Advisory    Name Of Caller: Nurse Michelle @ [facility]      Contact Preference: [phone]        Fax: [phone]    Nature of call:  Nurse Michelle @ [facility] calling to get orders for a test MRA faxed over.

## 2024-02-26 ENCOUNTER — TELEPHONE (OUTPATIENT)
Dept: TRANSPLANT | Facility: CLINIC | Age: 39
End: 2024-02-26
Payer: MEDICARE

## 2024-02-26 NOTE — TELEPHONE ENCOUNTER
----- Message from Shannon Santana sent at 2/26/2024 10:09 AM CST -----  Regarding: Consult/Advisory  Contact: [phone]  CONSULT/ADVISORY    Name of Caller:  Michelle with [facility]    Contact Preference:  [phone]    Nature of Call: [facility] and echo was received from the [facility].

## 2024-02-26 NOTE — TELEPHONE ENCOUNTER
Returned Michelle's call regarding testing. She was in with another pt. Left message that we haven't received any records as of yet.

## 2024-04-15 ENCOUNTER — TELEPHONE (OUTPATIENT)
Dept: TRANSPLANT | Facility: CLINIC | Age: 39
End: 2024-04-15
Payer: MEDICARE

## 2024-04-15 NOTE — TELEPHONE ENCOUNTER
----- Message from Pamela Fried sent at 4/15/2024 11:05 AM CDT -----  Regarding: Update      Name Of Caller:   Chava      Contact Preference:   [phone]      Nature of call:   Pt would like to provide Sweetie with an update regarding their new medication prescriptions from cardiologist Dr. Dania Jimenez.

## 2024-04-29 ENCOUNTER — PATIENT MESSAGE (OUTPATIENT)
Dept: TRANSPLANT | Facility: CLINIC | Age: 39
End: 2024-04-29
Payer: MEDICARE

## 2024-04-30 ENCOUNTER — PATIENT MESSAGE (OUTPATIENT)
Dept: TRANSPLANT | Facility: CLINIC | Age: 39
End: 2024-04-30
Payer: MEDICARE

## 2024-05-14 ENCOUNTER — TELEPHONE (OUTPATIENT)
Dept: TRANSPLANT | Facility: CLINIC | Age: 39
End: 2024-05-14
Payer: MEDICARE

## 2024-05-14 NOTE — TELEPHONE ENCOUNTER
Attempted to contact [facility] regarding pt's MRA records. I was on hold for 10 min with no answer. I've re req'd the records STAT through [facility].

## 2024-05-17 ENCOUNTER — COMMITTEE REVIEW (OUTPATIENT)
Dept: TRANSPLANT | Facility: CLINIC | Age: 39
End: 2024-05-17
Payer: MEDICARE

## 2024-05-17 ENCOUNTER — EPISODE CHANGES (OUTPATIENT)
Dept: TRANSPLANT | Facility: HOSPITAL | Age: 39
End: 2024-05-17

## 2024-05-17 NOTE — LETTER
May 17, 2024    Waldemar Landry MD  [address] [facility] [place] [address]  Phone: [phone]  Fax: [phone]     Dear Dr. Landry:    Patient: Chava Lucia   MR Number: [id number]   YOB: 1985     Your patient, Chava Lucia, was recently discussed at the [facility] Selection Committee meeting on 5/17/2024. I am happy to inform you that Chava has been approved for transplantation.  He has met selection criteria for a kidney transplant related to ESRD secondary to primary diagnosis of Polycystic Kidneys. Your patient will be placed on the cadaveric wait list pending final financial approval from insurance company.     We appreciate your confidence in allowing us to participate in your patients care.  If you have any questions or concerns, please do not hesitate to contact me.    Sincerely,      Razia Landry MD  Medical Director, Kidney & Kidney/Pancreas Transplantation  lh  CC:  Chava Lucia (patient)          [facility] Peritoneal Dialysis

## 2024-05-17 NOTE — COMMITTEE REVIEW
Native Organ Dx: Polycystic Kidneys      SELECTION COMMITTEE NOTE    Chava Lucia was presented at selection committee on 5/17/2024 .  Patient met selection criteria for kidney transplant related to ESRD due to   Polycystic Kidneys.  No absolute contraindications to transplant at this time.  Patient will be placed on the cadaveric wait list pending final financial approval from insurance company.  Patient will return to clinic for routine appointment in 1 year(s). Patient does not meet criteria for High KDPI kidney offer due to age. Patient meets HCV+ kidney offer. Patient does not meet criteria for dual due to age. Patient is a candidate for enbloc.  Planned immunosuppression Thymoglobulin.    Patient was informed of the decision after the committee meeting by the phone.    Note written by   Sweetie Delvalle RN  ===============================================  I was present at the meeting and attest to the decision of the selection committee     Mallory Thakur DO  Transplant Nephrology

## 2024-05-21 ENCOUNTER — TELEPHONE (OUTPATIENT)
Dept: TRANSPLANT | Facility: CLINIC | Age: 39
End: 2024-05-21
Payer: MEDICARE

## 2024-05-21 DIAGNOSIS — Z76.82 ORGAN TRANSPLANT CANDIDATE: Primary | ICD-10-CM

## 2024-05-21 NOTE — LETTER
May 21, 2024    Chava Lucia  [address] [place] [address]    Dear Chava Lucia:  MRN: [id number]    Congratulations! On 2024, you were placed on  the waiting list for a  donor transplant.    Your candidacy for kidney transplant is based on the following criteria: ESRD due to   Polycystic Kidneys. .    Your transplant coordinator while on the waiting list is Easton Escobar RN. They can be reached at [phone] or [phone] with any questions.      What to do now?    Ask your living donors to begin testing   Share our screening website with anyone interested: [email or web address]  Make sure donors have your name and date of birth  You will get transplanted much faster if you have a living donor    Have your blood sent to our Transplant Lab every month  If you are on dialysis - our Transplant Lab will work with your dialysis unit to send your blood every month  If you are not on dialysis   If you live near an [facility] lab, we will schedule you to have blood drawn every month  If you do not live near an [facility] lab, you will be sent blood kits in the mail. You will need to take a kit to your local lab or doctor to have your blood drawn every month and mail to the Transplant Lab.     Call us with ANY CHANGES  Phone numbers - we must be able to reach you anytime of the day or night when a kidney is available  Address  Insurance coverage  Dialysis unit or kidney doctor  Benjamin: if you have surgery, stay in the hospital, have to get blood, or have an infection    Review your Kidney/Kidney-Pancreas Transplant Guide   This will give you detailed information about what happens when  you are on the waiting list   you are called when a kidney is available    The [facility] has a transplant surgeon and physician available 365 days a year, 24 hours a day to coordinate organ acceptance, procurement, surgical placement and to address urgent patient care issues.  You  will be notified in writing of any changes to our Transplant Centers staffing plan that would impact your ability to receive a transplant.    Attached is a letter from the United Network for Organ Sharing (UNOS). It describes the services and information offered to patients by UNOS and the Organ Procurement and Transplant Network. We look forward to working with you while on the waiting list.     We would like to inform you of an important OPTN (Organ Procurement and Transplant Network) Policy change that may affect the waiting time for some candidates on our waiting list.     Waiting time is important in identifying who receives offers for kidneys. A long wait time may increase your chance of getting an offer. Wait time is based on a test called eGFR that tells how well your kidneys are working. Wait time could also be based on how long you have been on dialysis. Government and health officials have changed the way this test is used. Before this year, hospitals used an eGFR that would include your race. For Black or  Americans, this eGFR could have shown that their kidneys were working better than they were.    Because of this change, we are looking at everyones record and assessing waiting time for people who are eligible. We will be reviewing everyones medical records and will contact you if you are eligible.     Who can I talk to if I have a question?  You can contact us if you have questions or send a message through [facility].     Please give us time to answer your questions. We are working on this for many patients.    How can I learn more about eGFR and this policy change?  Go to OPTN website > Patients > Kidney > FAQ: Understanding race-neutral eGFR calculations  Full URL: https://optn.transplant.hrsa.gov/patients/by-organ/kidney/understanding-the-proposal-to-require-race-neutral-egfr-calculations/  Call the Organ Procurement and Transplantation Network (OPTN) toll-free patient services line at  8-151-629-6396    Congratulations,    Your Transplant Partner  [facility]   [facility] [place] [address]  [phone]  jr  Cc: Dr. Waldemar Landry          [facility] Peritoneal Dialysis                                                               The Organ Procurement and Transplantation Network   Toll-free patient services line: [phone]  Your resource for organ transplant information      Staffed 8:30 am - 5:00 pm ET Monday - Friday   Leave a message 24/7 to receive a call back    The Organ Procurement and Transplantation Network (OPTN) is the national transplant system. It makes the policies that decide how donated organs are matched to patients waiting for a transplant. The OPTN:    Makes sure donated organs get matched to people on the transplant waiting list  Tells people about the donation and transplant processes  Makes sure that the public knows about the need for more organ and tissue donations    The OPTN has a free patient services line that you can call to:  Get more information about:   o Organ donation and organ transplants   o Donation and transplant policies  Get an information kit with:   o A list of transplant hospitals   o Waiting list information  Talk about any questions you may have about your transplant hospital or organ procurement organization. The staff will do their best to help you or point you to others who may help.  Find out how you can volunteer with the OPTN and help shape transplant policy    The patient services line number is: 5-662-150-0227    Patient services line staff CANNOT answer questions about your own medical care, including:  Waiting list status  Test results  Medical records  You will need to call your transplant hospital for this information.    The following websites have more information about transplantation and donation:  OPTN: https://optn.transplant.[facility].gov/  For potential living donors and  transplant recipients:   o Living with transplant: https://www.transplantliving.org/   o Living donation process: https://optn.transplant.hrsa.gov/living-donation/     o Financial assistance: https://www.livingdonorassistance.org/  Transplantation data: https://www.srtr.org/  Organ donation: https://www.organdonor.gov/    Volunteer with the OPTN: https://optn.transplant.hrsa.gov/get-involved

## 2024-05-21 NOTE — TELEPHONE ENCOUNTER
"KIDNEY WAIT LISTING NOTE    Date of Financial clearance to list: 2024    [facility]:     Organ: Kidney    Last Name: Rea  First Name: Chava    : 1985       Gender: male        MRN#: [id number]                                   State of Permanent Residence: [address] [place] [address]  [place] [address]    Ethnicity: Not  or /a   Race:      White    CLINICAL INFORMATION   Candidate Medical Urgency Status: Active (1)  Number of Previous Kidney Transplants: 0  Number of Previous Solid Organ Transplants: 0  Did you enter number of previous kidney or other solid organ transplants? Yes  Is this Candidate a Prior Living Donor: No  (If yes, please generate letter to UNOS with patient's date of donation, recipient SSN, signed by Surgical Director after patient is listed in order to receive priority points).      ABO  ABO Blood Group:   A POS     ABO Confirmation: (THESE DATES MUST BE PRIOR TO THE LIST DATE AND SUPPORTED BY SEPARATE LAB REPORTS)    Internal Results    Lab Results   Component Value Date    GROUPTRH A POS 2024     No results found for: "ABO"    External Results    ABO Date 1:    ABO Date 2 A POS 2023  Are either of these ABO results based on External Labs? Yes  (If Yes, STOP and go to source document in Media Tab for verification).    VITALS  Height:  5' 8.98" (1.752 m)   Weight:  90.6 kg (199 lb 11.8 oz)   (Use height from Transplant clinic visits only).  Did you enter height/weight? Yes    HLA    Class I:  Lab Results   Component Value Date    HYFM5KB 1 2024    GJJL2AL 30 2024    HGIQ9CT 13 2024    JZJC9YP 57 2024    JKOGW8LB 4 2024    XQARV9YT XX 2024    DQTAL3ET 6 2024    HBJKP7YF XX 2024       Class II:  Lab Results   Component Value Date    EFQRVI88YS 7 2024    JUFCIA44TD XX 2024    CHYOSV548ZM 53 2024    RGCAJS6634 XX 2024    YNJJF0ZV 2 2024    MLVPQ1XV 9 2024       Tested for HLA " "Antibodies: Yes, antibodies detected     If result is "Positive" antibodies are detected     If result is "Negative or questionable" no antibodies detected    No results found for: "CIPRAS", "CIIPRAS"    DIALYSIS INFORMATION  Is patient Pre-Dialysis: No     GFR Information  Report GFR being used as the criteria for placement on the kidney list. If not, leave blank  GFR < or = 20 ml/min? n/a  If Yes, Specify value  ___   ml/min     Initial date GFR became 20 or less:   Is GFR obtained from an Outside lab Result? n/a  (If YES verify with source document scanned into media)    If patient on Dialysis:    Is candidate currently on dialysis for ESRD? Yes  If Yes,  Date Chronic Dialysis Started:   11/23/2022  (verify with source document in Media Tab)   Dialysis Unit Name: [facility] Peritoneal Dialysis  [address] [facility] [address] [facility] [facility] [address]  [place] [address]                        Physician Name:  Dr. Razia Merritt  NPI#: [id number]    DIABETES INFORMATION  Primary Native Kidney Diagnosis: Polycystic Kidneys  C-Peptide Value - No results found for: "CPEPTIDE"  Current Diabetes Status: None    FOR NON-KIDNEY DEPARTMENT USE ONLY:  Additional Organs Registered? none    Maximum Acceptable Number of HLA Mismatches  ABDR:     6      (0-6)               AB:               (0-4)  ADR:   _____  (0-4)              BDR: _____ (0-4)  A:        _____  (0-2)              B:      _____ (0-2)          DR: ______ (0-2)    Will Recipient Accept?   Accept HBcAB Positive Organ:            Yes  Accept HBV CHARLY Positive Organ:        No  Accept HCV Antibody Positive Organ: Yes   Accept HCV CHARLY Positive Organ: Yes    Dual Kidney and En Bloc Opt In : No  Dual  Local:   No  Dual Import:   No  En Bloc Local:   No  En Bloc Import: No     Accept KDPI > 85: Single: No     Local: No     Import: No  Accept KDPI > 85: Dual: No     Local: No     Import: No    ### NURSE TO VERIFY CONSENT AND MAKE ANY NECESSARY CHANGES NEEDED IN UNET AT THE TIME OF " VERIFICATION ###    Unacceptible Antigens  If yes, list     Lab Results   Component Value Date    CIABCLM A66 01/25/2024    CIIAB Negative 01/25/2024       ### DO NOT LIST IF ANTIGEN VALUE WEAK ###    eGFR Wait Time Modification    Based on Race White does patient qualify for lab review? No     If found, generate eGFR Wait Time Modification Form and scan into Media.   Send patient letter when wait time is granted.     Hep B Vaccine series completed: yes    Blood Type x2 was verified by myself and Bin Hobbs RN.  Blood type determination and reporting was completed according to the programs protocols and OPTN requirements.

## 2024-09-16 DIAGNOSIS — N18.6 END STAGE RENAL DISEASE: Primary | ICD-10-CM

## 2024-09-16 DIAGNOSIS — Z76.82 ORGAN TRANSPLANT CANDIDATE: ICD-10-CM

## 2024-09-16 NOTE — PROGRESS NOTES
YEARLY LIST MANAGEMENT NOTE    Chava Lucia's kidney transplant listing status reviewed.  Patient is due for follow-up appointments in December 2024.   Appointments will be scheduled per protocol.  HLA sample is past due at this time with last sample being received in January 2024

## 2024-09-19 ENCOUNTER — TELEPHONE (OUTPATIENT)
Dept: TRANSPLANT | Facility: CLINIC | Age: 39
End: 2024-09-19
Payer: MEDICARE

## 2024-09-23 ENCOUNTER — TELEPHONE (OUTPATIENT)
Dept: TRANSPLANT | Facility: CLINIC | Age: 39
End: 2024-09-23
Payer: MEDICARE

## 2024-09-23 NOTE — TELEPHONE ENCOUNTER
Spoke to pt confirming scheduled annual test and clinic visit on 12/05/2024. Appt reminders were mailed and pt is aware to bring care giver.

## 2024-09-29 ENCOUNTER — TELEPHONE (OUTPATIENT)
Dept: TRANSPLANT | Facility: CLINIC | Age: 39
End: 2024-09-29
Payer: MEDICARE

## 2024-09-29 DIAGNOSIS — Z76.82 AWAITING ORGAN TRANSPLANT STATUS: Primary | ICD-10-CM

## 2024-09-30 NOTE — TELEPHONE ENCOUNTER
ON-CALL NOTE    UNOS# [id number]    Notified by Walt Rodriguez, , that Chava Lucia is eligible for kidney offer.  Spoke with patient and identified no acute medical issues with telephone assessment. Protocol script read to patient regarding PHS risk and HCV+ donor offer . Patient verbalized understanding, all questions answered, patient accepts organ offer. Notified by Walt Rodriguez that virtual crossmatch is  unable to be performed due to last sample outdated (Jan 2024). Patient will come to ER to provide fresh sample for organ offer .  Patient reports no sensitizing event since last blood sample for PRA received. Notified Chema in HLA Lab to perform a prospective  crossmatch per guideline.      Patient notified of plan to come to ER at main [place] to provide fresh sample and return home to be on standby and states understanding.      9/30/24: Crossmatch negative, remains on standby at home as a backup.     0700 - report given to Easton Escobar RN for continuity of care

## 2024-10-01 ENCOUNTER — TELEPHONE (OUTPATIENT)
Dept: TRANSPLANT | Facility: CLINIC | Age: 39
End: 2024-10-01
Payer: MEDICARE

## 2024-10-01 NOTE — TELEPHONE ENCOUNTER
Late entry from 9/30/24 at 0700    ON-CALL NOTE    UNOS# [id number]    0700:  Case assumed from Paty Mercado RN.    0900:  Patient called and given progress of case.  No donor OR at this time.    1500:  Patient called and notified that donor OR is now set for 2000.  Patient requested to begin NPO status at 2200 in case of event of needed admission.  Patient verbalized understanding.  Patient advised to expect update on 10/1/24 between 0200 and 0400.    10/1/24 at 0230:  Notification from Benji Mulligan, , that organs have gone to a higher ranked recipient.  Patient notified of this and requested to resume normal activities.  Patient advised he would keep same position on the kidney transplant waiting list and to answer all calls.  Patient verbalized understanding.

## 2024-11-25 ENCOUNTER — TELEPHONE (OUTPATIENT)
Dept: TRANSPLANT | Facility: CLINIC | Age: 39
End: 2024-11-25
Payer: MEDICARE

## 2024-11-25 NOTE — TELEPHONE ENCOUNTER
MA notes per Adherence form     PD PT    FOR THE PAST THREE MONTHS:    0-AMA's  0-No-shows    No concerns with care giving, transportation, or mental health    Scanned in pt's media    Chiquis Rosenberg  Abdominal Transplant MA

## 2024-12-05 ENCOUNTER — HOSPITAL ENCOUNTER (OUTPATIENT)
Dept: CARDIOLOGY | Facility: HOSPITAL | Age: 39
Discharge: HOME | End: 2024-12-05
Attending: NURSE PRACTITIONER
Payer: MEDICARE

## 2024-12-05 ENCOUNTER — HOSPITAL ENCOUNTER (OUTPATIENT)
Dept: RADIOLOGY | Facility: HOSPITAL | Age: 39
Discharge: HOME | End: 2024-12-05
Attending: NURSE PRACTITIONER
Payer: MEDICARE

## 2024-12-05 ENCOUNTER — OFFICE VISIT (OUTPATIENT)
Dept: TRANSPLANT | Facility: CLINIC | Age: 39
End: 2024-12-05
Payer: MEDICARE

## 2024-12-05 VITALS
HEART RATE: 83 BPM | HEIGHT: 69 IN | OXYGEN SATURATION: 99 % | SYSTOLIC BLOOD PRESSURE: 124 MMHG | RESPIRATION RATE: 18 BRPM | TEMPERATURE: 97 F | DIASTOLIC BLOOD PRESSURE: 79 MMHG | BODY MASS INDEX: 29.52 KG/M2 | WEIGHT: 199.31 LBS

## 2024-12-05 VITALS
HEIGHT: 69 IN | HEART RATE: 70 BPM | BODY MASS INDEX: 29.47 KG/M2 | WEIGHT: 199 LBS | SYSTOLIC BLOOD PRESSURE: 119 MMHG | DIASTOLIC BLOOD PRESSURE: 74 MMHG

## 2024-12-05 DIAGNOSIS — N18.6 ANEMIA IN ESRD (END-STAGE RENAL DISEASE): ICD-10-CM

## 2024-12-05 DIAGNOSIS — Z99.2 ESRD ON PERITONEAL DIALYSIS: ICD-10-CM

## 2024-12-05 DIAGNOSIS — Z76.82 PATIENT ON WAITING LIST FOR KIDNEY TRANSPLANT: Primary | ICD-10-CM

## 2024-12-05 DIAGNOSIS — N18.6 ESRD ON PERITONEAL DIALYSIS: ICD-10-CM

## 2024-12-05 DIAGNOSIS — D63.1 ANEMIA IN ESRD (END-STAGE RENAL DISEASE): ICD-10-CM

## 2024-12-05 DIAGNOSIS — Z76.82 ORGAN TRANSPLANT CANDIDATE: ICD-10-CM

## 2024-12-05 DIAGNOSIS — Q61.3 POLYCYSTIC KIDNEY: Chronic | ICD-10-CM

## 2024-12-05 DIAGNOSIS — N18.6 BENIGN HYPERTENSION WITH ESRD (END-STAGE RENAL DISEASE): ICD-10-CM

## 2024-12-05 DIAGNOSIS — I12.0 BENIGN HYPERTENSION WITH ESRD (END-STAGE RENAL DISEASE): ICD-10-CM

## 2024-12-05 PROBLEM — N25.81 HYPERPARATHYROIDISM DUE TO ESRD ON DIALYSIS: Status: ACTIVE | Noted: 2019-03-08

## 2024-12-05 LAB
ASCENDING AORTA: 3.31 CM
AV AREA BY CONTINUOUS VTI: 4.8 CM2
AV INDEX (PROSTH): 0.97
AV LVOT MEAN GRADIENT: 2 MMHG
AV LVOT PEAK GRADIENT: 3 MMHG
AV MEAN GRADIENT: 2.4 MMHG
AV PEAK GRADIENT: 4 MMHG
AV VALVE AREA BY VELOCITY RATIO: 4.4 CM²
AV VALVE AREA: 4.7 CM2
AV VELOCITY RATIO: 0.9
BSA FOR ECHO PROCEDURE: 2.1 M2
CV ECHO LV RWT: 0.54 CM
DOP CALC AO PEAK VEL: 1 M/S
DOP CALC AO VTI: 17.3 CM
DOP CALC LVOT AREA: 4.9 CM2
DOP CALC LVOT DIAMETER: 2.5 CM
DOP CALC LVOT PEAK VEL: 0.9 M/S
DOP CALC LVOT STROKE VOLUME: 81.9 CM3
DOP CALCLVOT PEAK VEL VTI: 16.7 CM
E WAVE DECELERATION TIME: 140.8 MS
E/A RATIO: 1.11
E/E' RATIO: 5.56 M/S
ECHO EF ESTIMATED: 62 %
ECHO LV POSTERIOR WALL: 1 CM (ref 0.6–1.1)
EJECTION FRACTION: 55 %
FRACTIONAL SHORTENING: 32.4 % (ref 28–44)
INTERVENTRICULAR SEPTUM: 1.1 CM (ref 0.6–1.1)
IVC DIAMETER: 1.79 CM
IVRT: 68.51 MS
LA MAJOR: 4.82 CM
LA MINOR: 4.64 CM
LA WIDTH: 3.98 CM
LEFT ATRIUM SIZE: 3.14 CM
LEFT ATRIUM VOLUME INDEX MOD: 35.4 ML/M2
LEFT ATRIUM VOLUME INDEX: 24.4 ML/M2
LEFT ATRIUM VOLUME MOD: 72.91 ML
LEFT ATRIUM VOLUME: 50.23 CM3
LEFT INTERNAL DIMENSION IN SYSTOLE: 2.5 CM (ref 2.1–4)
LEFT VENTRICLE DIASTOLIC VOLUME INDEX: 27.88 ML/M2
LEFT VENTRICLE DIASTOLIC VOLUME: 57.43 ML
LEFT VENTRICLE MASS INDEX: 58.6 G/M2
LEFT VENTRICLE SYSTOLIC VOLUME INDEX: 10.7 ML/M2
LEFT VENTRICLE SYSTOLIC VOLUME: 22.08 ML
LEFT VENTRICULAR INTERNAL DIMENSION IN DIASTOLE: 3.7 CM (ref 3.5–6)
LEFT VENTRICULAR MASS: 120.8 G
LV LATERAL E/E' RATIO: 5
LV SEPTAL E/E' RATIO: 6.25
MV A" WAVE DURATION": 174.12 MS
MV PEAK A VEL: 0.45 M/S
MV PEAK E VEL: 0.5 M/S
OHS CV RV/LV RATIO: 0.89 CM
PISA TR MAX VEL: 2.5 M/S
PULM VEIN A" WAVE DURATION": 174.12 MS
PULM VEIN S/D RATIO: 1.06
PULMONIC VEIN PEAK A VELOCITY: 0.3 M/S
PV PEAK D VEL: 0.33 M/S
PV PEAK S VEL: 0.35 M/S
RA MAJOR: 3.84 CM
RA PRESSURE ESTIMATED: 3 MMHG
RA WIDTH: 3.03 CM
RIGHT ATRIAL AREA: 10.7 CM2
RIGHT VENTRICLE DIASTOLIC BASEL DIMENSION: 3.3 CM
RV TB RVSP: 6 MMHG
RV TISSUE DOPPLER FREE WALL SYSTOLIC VELOCITY 1 (APICAL 4 CHAMBER VIEW): 13.28 CM/S
SINUS: 3.68 CM
STJ: 3.38 CM
TDI LATERAL: 0.1 M/S
TDI SEPTAL: 0.08 M/S
TDI: 0.09 M/S
TR MAX PG: 25 MMHG
TRICUSPID ANNULAR PLANE SYSTOLIC EXCURSION: 1.98 CM
TV PEAK GRADIENT: 25 MMHG
TV REST PULMONARY ARTERY PRESSURE: 28 MMHG
Z-SCORE OF LEFT VENTRICULAR DIMENSION IN END DIASTOLE: -5.2
Z-SCORE OF LEFT VENTRICULAR DIMENSION IN END SYSTOLE: -3.33

## 2024-12-05 PROCEDURE — 76770 US EXAM ABDO BACK WALL COMP: CPT | Mod: TC,TXP

## 2024-12-05 PROCEDURE — 99215 OFFICE O/P EST HI 40 MIN: CPT | Mod: S$PBB,TXP,, | Performed by: NURSE PRACTITIONER

## 2024-12-05 PROCEDURE — 93978 VASCULAR STUDY: CPT | Mod: TC,TXP

## 2024-12-05 PROCEDURE — 99214 OFFICE O/P EST MOD 30 MIN: CPT | Mod: PBBFAC,25,TXP | Performed by: NURSE PRACTITIONER

## 2024-12-05 PROCEDURE — 99999 PR PBB SHADOW E&M-EST. PATIENT-LVL IV: CPT | Mod: PBBFAC,TXP,, | Performed by: NURSE PRACTITIONER

## 2024-12-05 PROCEDURE — 93306 TTE W/DOPPLER COMPLETE: CPT | Mod: TXP

## 2024-12-05 RX ORDER — TRAZODONE HYDROCHLORIDE 50 MG/1
TABLET ORAL
COMMUNITY
Start: 2024-12-02

## 2024-12-05 RX ORDER — OMEPRAZOLE 20 MG/1
20 CAPSULE, DELAYED RELEASE ORAL
COMMUNITY
Start: 2024-08-12 | End: 2025-08-12

## 2024-12-05 NOTE — LETTER
December 9, 2024        Waldemar Landry  [address]  [facility] [place] [address]  Phone: [phone]  Fax: [phone]             [facility]- Transplant 1st Fl  [address]  [facility] [address]  Phone: [phone]   Patient: Chava Lucia   MR Number: [id number]   YOB: 1985   Date of Visit: 12/5/2024       Dear Dr. Waldemar Landry    Thank you for referring Chava Lucia to me for evaluation. Attached you will find relevant portions of my assessment and plan of care.    If you have questions, please do not hesitate to call me. I look forward to following Chava Lucia along with you.    Sincerely,    Latanya Dan, NP    Enclosure    If you would like to receive this communication electronically, please contact [email or web address] or [phone] to request Doctor kinetic Link access.    Doctor kinetic Link is a tool which provides read-only access to select patient information with whom you have a relationship. Its easy to use and provides real time access to review your patients record including encounter summaries, notes, results, and demographic information.    If you feel you have received this communication in error or would no longer like to receive these types of communications, please e-mail [email or web address]

## 2024-12-05 NOTE — PROGRESS NOTES
Kidney Transplant Recipient Reevalulation    Referring Physician: Waldemar Landry  Current Nephrologist: Waldemar Landry  Waitlist Status: active  Dialysis Start Date: 11/23/2022    Subjective:     CC:  Annual reassessment of kidney transplant candidacy.    HPI:  Mr. Lucia is a 39 y.o. year old White male with ESRD secondary to polycystic kidney disease.  He has been on the wait list for a kidney transplant at [facility] since 11/23/2022. Patient is currently on peritoneal dialysis started on 11/23/22. Patient is dialyzing on cyclic peritoneal dialysis.  Patient reports that he is tolerating dialysis well. . He has a PD catheter. Patient denies any recent hospitalizations or ED visits.    Going through evaluation at [facility]   Listed at [facility]      LOV 1/25/24  Fx assessment:Unchanged .  Previously working as a . Very active, no functional deficits. Looks great, not frail.     Lab /diagnostic results /TXP [facility] reviewed      12/5/24 iliacs: triphasic bilaterally  12/5/24 Renal US: polycystic renal disease. Multiple simple to complex cysts without definite evidence for solid mass.   5/14/24 MRA Brain WO: No vascular malformation or aneurysm   4/1/24 CXR ree air seen on the right hemidiaphragm   1/25/24 PXR: No significant aortoiliac atherosclerotic vascular calcifications are seen   1/18/24 Lexiscan: Normal perfusion study    12/5/24 EF 55%, PA 28  11/1/23  CTA/P WO: favorable p//er txp surgery  EF %, PA   Past Medical History:   Diagnosis Date    Disorder of kidney and ureter     Hypertension     Polycystic kidney disease        Review of Systems   Constitutional:  Positive for fatigue. Negative for activity change, appetite change, chills, fever and unexpected weight change.   HENT:  Negative for congestion, facial swelling, postnasal drip, rhinorrhea, sinus pressure, sore throat and trouble swallowing.    Eyes:  Negative for pain, redness and visual disturbance.   Respiratory:  Negative for cough, chest  tightness, shortness of breath and wheezing.    Cardiovascular: Negative.  Negative for chest pain, palpitations and leg swelling.   Gastrointestinal:  Negative for abdominal pain, diarrhea, nausea and vomiting.   Genitourinary:  Positive for decreased urine volume. Negative for dysuria, flank pain and urgency.   Musculoskeletal:  Negative for gait problem, neck pain and neck stiffness.   Skin:  Negative for rash.   Allergic/Immunologic: Negative for environmental allergies, food allergies and immunocompromised state.   Neurological:  Negative for dizziness, weakness, light-headedness and headaches.   Psychiatric/Behavioral:  Negative for agitation and confusion. The patient is not nervous/anxious.        Objective:   body mass index is 29.42 kg/m².    Physical Exam  Constitutional:       Appearance: Normal appearance. He is well-developed.   HENT:      Head: Normocephalic.      Nose: Nose normal.   Eyes:      Conjunctiva/sclera: Conjunctivae normal.      Pupils: Pupils are equal, round, and reactive to light.   Cardiovascular:      Rate and Rhythm: Normal rate and regular rhythm.      Heart sounds: Normal heart sounds.   Pulmonary:      Effort: Pulmonary effort is normal.      Breath sounds: Normal breath sounds.   Abdominal:      General: Bowel sounds are normal.      Palpations: Abdomen is soft.      Comments: PD catheter    Musculoskeletal:      Cervical back: Normal range of motion and neck supple.   Skin:     General: Skin is warm and dry.   Neurological:      Mental Status: He is alert and oriented to person, place, and time.   Psychiatric:         Behavior: Behavior normal.         Labs:  Lab Results   Component Value Date    WBC 11.55 01/25/2024    HGB 12.8 (L) 01/25/2024    HCT 38.1 (L) 01/25/2024     01/25/2024    K 3.6 01/25/2024     01/25/2024    CO2 22 (L) 01/25/2024    BUN 45 (H) 01/25/2024    CREATININE 11.1 (H) 01/25/2024    EGFRNORACEVR 5.5 (A) 01/25/2024    CALCIUM 9.9 01/25/2024     "PHOS 4.6 (H) 01/25/2024    ALBUMIN 3.8 01/25/2024    AST 9 (L) 01/25/2024    ALT 13 01/25/2024    .1 (H) 01/25/2024       No results found for: "PREALBUMIN", "BILIRUBINUA", "GGT", "AMYLASE", "LIPASE", "PROTEINUA", "NITRITE", "RBCUA", "WBCUA"    No results found for: "HLAABCTYPE"    Lab Results   Component Value Date    CPRA 0 09/29/2024    CIABCLM A66 09/29/2024    CIIAB Negative 01/25/2024    ABCMT NONE 09/29/2024       Labs were reviewed with the patient.    Pre-transplant Workup:   Reviewed with the patient.    Assessment:     1. Patient on waiting list for kidney transplant    2. ESRD on peritoneal dialysis    3. Polycystic kidney    4. Benign hypertension with ESRD (end-stage renal disease)    5. Anemia in ESRD (end-stage renal disease)        Plan:      Transplant Candidacy:   Mr. Lucia is a suitable kidney transplant candidate.  Meets center eligibility for accepting HCV+ donor offer - Yes.  Patient educated on HCV+ donors. Chava is willing  to accept HCV+ donor offer -  Yes   Patient is a candidate for KDPI > 85 kidney donor offer - No d/t age and weight   He remains in overall stable health, and will remain active on the transplant list.    Patient advised that it is recommended that all transplant candidates, and their close contacts and household members receive Covid vaccination.    Latanya Dan NP       Follow-up:   In addition to the tests noted in the plan, Mr. Lucia will continue to have reevaluation as per the standing pre-kidney transplant protocol:  Monthly blood for PRA  Annual return to clinic, except HIV positive, > 65 years of age, or pancreas transplant candidates who will be scheduled to see transplant every 6 months while in pre-transplant phase  Annual re-testing: CXR, EKG, yearly mammograms for women over 40 and PSA for males over 40, cardiology follow-up as recommended by initial cardiology pre-transplant evaluation  Renal ultrasound every 2 years  Baseline colonoscopy " after age 50 and repeated as recommended    UNOS Patient Status  Functional Status: 60% - Requires occasional assistance but is able to care for needs  Physical Capacity: No Limitations

## 2024-12-05 NOTE — PROGRESS NOTES
Transplant Psychosocial Evaluation Update for kidney, kidney/pancreas or pancreas only transplant.    Last psychosocial evaluation for transplant was completed on 01/25/2024 by Alan Millan, .     Pt presents today in company of Deborah, (17), daughter. Pt and daughter present as alert, oriented to person, place, time, purpose of visit. Pt denied concerns about going through with transplant and state motivation and sense of preparedness for transplantation, caregiver role, psychosocial risk factors, medical risk factors, financial aspects, and lifetime commitments. All concerns and education points as per transplant psychosocial evaluation process addressed (also refer to psychosocial dated 01/25/2024). Pt actively participated in today's interview, with daughter.  Pt asking questions appropriately and denies changes to previous psychosocial evaluation for transplantation which we reviewed line by line with pt and, per pt choice.    Primary Caregivers and Transportation for Transplant:  1. Patricia Lucia, wife, [phone]  2. Jasmyne Pierce, mother-in-law,  [phone]    Both pt and caregiver/s plan to stay locally at [place] location - information reviewed in depth. Caregivers plan to stay at hospital as appropriate for transplant and post-transplant process.    Pts Vocation: Patient is disabled due to ESRD.     DME: None     ADLS:  Patient is independent. Patient can ambulate, complete ADL's, drive and manage medications without assistance.    Household and Dependents: pt resides with christina Gomez and has 3 dependents at this time.   Children: Caesbrina, 11, Wrilyn, 13 and Jacaitlyndeloris, 17 (drives)  Children will be carried for by family at time of transplant.     Income: Pt states ability to afford all monthly expenses and costs including for medications now and if transplanted based on income and on savings and assets.    Dialysis Adherence: Patient does peritoneal dialysis (PD) at home. Patient reported  he's complete with treatment. Dialysis adherence form completed and returned by patient's dialysis unit can be found under 'Media' section of EMR. Compliance reported as satisfactory.    Pt deny any additional concerns, stating preparedness and motivation to proceed with organ transplant.     Patient denied needing or wanting mental health resources or referrals at this time. Patient agreed to contact  team as needed.    Suitability for Transplant:   Pt remains low risk for transplant at this time based on psychosocial risk factors and strengths. Patient has adequate family and caregiver plan, good adherence, appropriate coping skills, medical insurance, reliable transportation and being able to financially can afford medications.       Serenity Irby LCSW

## 2025-08-26 DIAGNOSIS — N18.6 END STAGE RENAL DISEASE: Primary | ICD-10-CM

## 2025-08-26 DIAGNOSIS — Z76.82 ORGAN TRANSPLANT CANDIDATE: ICD-10-CM
